# Patient Record
Sex: FEMALE | Race: WHITE | NOT HISPANIC OR LATINO | URBAN - METROPOLITAN AREA
[De-identification: names, ages, dates, MRNs, and addresses within clinical notes are randomized per-mention and may not be internally consistent; named-entity substitution may affect disease eponyms.]

---

## 2017-03-30 ENCOUNTER — EMERGENCY (EMERGENCY)
Facility: HOSPITAL | Age: 22
LOS: 1 days | Discharge: HOME | End: 2017-03-30
Attending: EMERGENCY MEDICINE
Payer: COMMERCIAL

## 2017-03-30 VITALS
HEART RATE: 62 BPM | HEIGHT: 67 IN | WEIGHT: 117.07 LBS | TEMPERATURE: 98 F | RESPIRATION RATE: 20 BRPM | OXYGEN SATURATION: 98 % | DIASTOLIC BLOOD PRESSURE: 78 MMHG | SYSTOLIC BLOOD PRESSURE: 119 MMHG

## 2017-03-30 DIAGNOSIS — T78.40XA ALLERGY, UNSPECIFIED, INITIAL ENCOUNTER: ICD-10-CM
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
## 2017-03-30 PROCEDURE — 99284 EMERGENCY DEPT VISIT MOD MDM: CPT | Mod: 25

## 2017-03-30 PROCEDURE — 96374 THER/PROPH/DIAG INJ IV PUSH: CPT

## 2017-03-30 PROCEDURE — 96375 TX/PRO/DX INJ NEW DRUG ADDON: CPT

## 2017-03-30 RX ORDER — DIPHENHYDRAMINE HCL 50 MG
25 CAPSULE ORAL EVERY 4 HOURS
Qty: 0 | Refills: 0 | Status: DISCONTINUED | OUTPATIENT
Start: 2017-03-30 | End: 2017-04-03

## 2017-03-30 RX ADMIN — Medication 125 MILLIGRAM(S): at 03:47

## 2017-03-30 RX ADMIN — Medication 25 MILLIGRAM(S): at 03:47

## 2017-03-30 NOTE — ED ADULT NURSE NOTE - OBJECTIVE STATEMENT
received patient alert skin warm and dry color good body red and itchy started around 12 tonight no new meds or products will continue to follow

## 2017-03-30 NOTE — ED PROVIDER NOTE - OBJECTIVE STATEMENT
23 yo female with c/o allergic reaction, pt states tonight she developed itching to her body after going out to dinner , pt with similar reactions unsure what illicit reaction never had allergy testing denies sob or throat discomfort

## 2017-03-30 NOTE — ED ADULT TRIAGE NOTE - CHIEF COMPLAINT QUOTE
patient having allergic reaction patient states took 50 mg of bendryl at 1130 and states started having hived and getting swelling to lower lip that starred about 1230 am patient states having short of breath and very itchy no drooling noted

## 2025-01-31 NOTE — ED ADULT NURSE NOTE - FAMILY HISTORY
Patient seen and examined on dialysis  Vitals noted  Having issues with arterial side sheath  Thus far 1500 mL of fluid removed  Patient not allowing to continue treatment any further  Had long discussion with patient  She is agreeable to repeating dialysis tomorrow     No pertinent family history in first degree relatives